# Patient Record
Sex: FEMALE | Race: WHITE | Employment: FULL TIME | URBAN - METROPOLITAN AREA
[De-identification: names, ages, dates, MRNs, and addresses within clinical notes are randomized per-mention and may not be internally consistent; named-entity substitution may affect disease eponyms.]

---

## 2021-04-14 ENCOUNTER — OFFICE VISIT (OUTPATIENT)
Dept: PERINATAL CARE | Facility: HOSPITAL | Age: 31
End: 2021-04-14
Attending: OBSTETRICS & GYNECOLOGY
Payer: COMMERCIAL

## 2021-04-14 VITALS
HEART RATE: 94 BPM | HEIGHT: 60 IN | BODY MASS INDEX: 26.7 KG/M2 | WEIGHT: 136 LBS | DIASTOLIC BLOOD PRESSURE: 79 MMHG | SYSTOLIC BLOOD PRESSURE: 122 MMHG

## 2021-04-14 DIAGNOSIS — Z03.75 SUSPECTED SHORTENING OF CERVIX NOT FOUND: ICD-10-CM

## 2021-04-14 DIAGNOSIS — O30.042 TWIN PREGNANCY, DICHORIONIC/DIAMNIOTIC, SECOND TRIMESTER: ICD-10-CM

## 2021-04-14 DIAGNOSIS — O30.042 TWIN PREGNANCY, DICHORIONIC/DIAMNIOTIC, SECOND TRIMESTER: Primary | ICD-10-CM

## 2021-04-14 PROCEDURE — 76817 TRANSVAGINAL US OBSTETRIC: CPT

## 2021-04-14 PROCEDURE — 76812 OB US DETAILED ADDL FETUS: CPT

## 2021-04-14 PROCEDURE — 76817 TRANSVAGINAL US OBSTETRIC: CPT | Performed by: OBSTETRICS & GYNECOLOGY

## 2021-04-14 PROCEDURE — 99203 OFFICE O/P NEW LOW 30 MIN: CPT | Performed by: OBSTETRICS & GYNECOLOGY

## 2021-04-14 PROCEDURE — 76811 OB US DETAILED SNGL FETUS: CPT | Performed by: OBSTETRICS & GYNECOLOGY

## 2021-04-14 PROCEDURE — 76812 OB US DETAILED ADDL FETUS: CPT | Performed by: OBSTETRICS & GYNECOLOGY

## 2021-04-14 NOTE — PROGRESS NOTES
Outpatient Maternal-Fetal Medicine Consultation    Dear Dr. Kaiser Yarbrough    Thank you for requesting ultrasound evaluation and maternal fetal medicine consultation on your patient Sonia Chicho.   As you are aware she is a 32year old female  with a twin pre ULTRASOUND  The patient had a twin level II ultrasound today which revealed a dichorionic diamniotic twin gestation with size consistent with dates and a normal detailed anatomic survey for twins A & B.      Ultrasound findings:   Twin A - IUP in cephalic p treatment.      Increased  Mortality of Twin Gestations   The mortality rate of infants is higher in multiple than torres pregnancies because of the increased rates of prematurity, growth abnormalities, other obstetric complications, and congeni strategies. IMPRESSION:  · IUP at 20w1d  · Dichorionic twin pregnancy     RECOMMENDATIONS:  · Continue care with Dr. Dimas Browne   · Weight gain 31-50 lb  Monthly growth US   Weekly NSTs at 32 weeks  Delivery at 38-38w6d.   ·       Thank you for lewisin

## 2021-05-25 NOTE — PROGRESS NOTES
Eliza Parker    Dear Dr. Simone Aguilera    Thank you for requesting ultrasound evaluation and maternal fetal medicine consultation on your patient Alicia Bland.   As you are aware she is a 32year old female  with a twin pregna of structural abnormalities are seen today. The patient understands that ultrasound cannot rule out all structural and chromosomal abnormalities.     Discordance - 2.7 %   See PACS/Imaging Tab For Complete Ultrasound Report  I interpreted the results and re

## 2021-05-26 ENCOUNTER — ULTRASOUND ENCOUNTER (OUTPATIENT)
Dept: PERINATAL CARE | Facility: HOSPITAL | Age: 31
End: 2021-05-26
Attending: OBSTETRICS & GYNECOLOGY
Payer: COMMERCIAL

## 2021-05-26 VITALS
HEIGHT: 60 IN | SYSTOLIC BLOOD PRESSURE: 135 MMHG | BODY MASS INDEX: 28.86 KG/M2 | WEIGHT: 147 LBS | DIASTOLIC BLOOD PRESSURE: 76 MMHG | HEART RATE: 114 BPM

## 2021-05-26 DIAGNOSIS — O30.042 TWIN PREGNANCY, DICHORIONIC/DIAMNIOTIC, SECOND TRIMESTER: Primary | ICD-10-CM

## 2021-05-26 DIAGNOSIS — O30.042 TWIN PREGNANCY, DICHORIONIC/DIAMNIOTIC, SECOND TRIMESTER: ICD-10-CM

## 2021-05-26 PROCEDURE — 76816 OB US FOLLOW-UP PER FETUS: CPT | Performed by: OBSTETRICS & GYNECOLOGY

## 2021-05-26 PROCEDURE — 99212 OFFICE O/P EST SF 10 MIN: CPT | Performed by: OBSTETRICS & GYNECOLOGY

## 2021-06-23 ENCOUNTER — ULTRASOUND ENCOUNTER (OUTPATIENT)
Dept: PERINATAL CARE | Facility: HOSPITAL | Age: 31
End: 2021-06-23
Attending: OBSTETRICS & GYNECOLOGY
Payer: COMMERCIAL

## 2021-06-23 VITALS
HEART RATE: 103 BPM | DIASTOLIC BLOOD PRESSURE: 70 MMHG | SYSTOLIC BLOOD PRESSURE: 124 MMHG | BODY MASS INDEX: 29 KG/M2 | WEIGHT: 149 LBS

## 2021-06-23 DIAGNOSIS — O30.043 DICHORIONIC DIAMNIOTIC TWIN PREGNANCY IN THIRD TRIMESTER: ICD-10-CM

## 2021-06-23 DIAGNOSIS — O30.042 TWIN PREGNANCY, DICHORIONIC/DIAMNIOTIC, SECOND TRIMESTER: ICD-10-CM

## 2021-06-23 DIAGNOSIS — O30.042 TWIN PREGNANCY, DICHORIONIC/DIAMNIOTIC, SECOND TRIMESTER: Primary | ICD-10-CM

## 2021-06-23 PROCEDURE — 76816 OB US FOLLOW-UP PER FETUS: CPT | Performed by: OBSTETRICS & GYNECOLOGY

## 2021-06-23 PROCEDURE — 99212 OFFICE O/P EST SF 10 MIN: CPT | Performed by: OBSTETRICS & GYNECOLOGY

## 2021-06-23 RX ORDER — MELATONIN
325
COMMUNITY

## 2021-06-23 NOTE — PROGRESS NOTES
Kartik Polanco    Dear Dr. Calista Hu    Thank you for requesting ultrasound evaluation and maternal fetal medicine consultation on your patient Bina Rascon.   As you are aware she is a 32year old female  with a torres p structural abnormalities are seen today. The patient understands that ultrasound cannot rule out all structural and chromosomal abnormalities.     Discordance - 7.1 %                 See PACS/Imaging Tab For Complete Ultrasound Report  I interpreted the res

## 2021-07-08 ENCOUNTER — TELEPHONE (OUTPATIENT)
Dept: HEMATOLOGY/ONCOLOGY | Facility: HOSPITAL | Age: 31
End: 2021-07-08

## 2021-07-08 PROBLEM — O99.013 ANEMIA COMPLICATING PREGNANCY IN THIRD TRIMESTER: Status: ACTIVE | Noted: 2021-07-08

## 2021-07-08 PROBLEM — O99.013 ANEMIA COMPLICATING PREGNANCY IN THIRD TRIMESTER (HCC): Status: ACTIVE | Noted: 2021-07-08

## 2021-07-08 NOTE — TELEPHONE ENCOUNTER
Spoke with patient to arrange transfusion. She states she was going to see another hematologist and receive her transfusion at [facility]. RN asked patient to call OB office with update. Order set removed and order discarded.

## 2021-07-08 NOTE — TELEPHONE ENCOUNTER
Received blood transfusion orders from Dr. Loren Juarez office. Spoke with Simone Shea and aware that only monitoring involved for out patient transfusion is maternal VS. Verbalized understanding and OK to proceed, she will also verify Dr. Jj Loomis is aware.  NST done

## 2021-07-21 ENCOUNTER — ULTRASOUND ENCOUNTER (OUTPATIENT)
Dept: PERINATAL CARE | Facility: HOSPITAL | Age: 31
End: 2021-07-21
Attending: OBSTETRICS & GYNECOLOGY
Payer: COMMERCIAL

## 2021-07-21 VITALS
WEIGHT: 156 LBS | HEART RATE: 87 BPM | BODY MASS INDEX: 30.63 KG/M2 | SYSTOLIC BLOOD PRESSURE: 131 MMHG | DIASTOLIC BLOOD PRESSURE: 77 MMHG | HEIGHT: 60 IN

## 2021-07-21 DIAGNOSIS — O30.043 DICHORIONIC DIAMNIOTIC TWIN PREGNANCY IN THIRD TRIMESTER: ICD-10-CM

## 2021-07-21 DIAGNOSIS — O30.043 DICHORIONIC DIAMNIOTIC TWIN PREGNANCY IN THIRD TRIMESTER: Primary | ICD-10-CM

## 2021-07-21 PROCEDURE — 76819 FETAL BIOPHYS PROFIL W/O NST: CPT

## 2021-07-21 PROCEDURE — 99212 OFFICE O/P EST SF 10 MIN: CPT | Performed by: OBSTETRICS & GYNECOLOGY

## 2021-07-21 PROCEDURE — 76819 FETAL BIOPHYS PROFIL W/O NST: CPT | Performed by: OBSTETRICS & GYNECOLOGY

## 2021-07-21 PROCEDURE — 76816 OB US FOLLOW-UP PER FETUS: CPT | Performed by: OBSTETRICS & GYNECOLOGY

## 2021-07-21 NOTE — PROGRESS NOTES
Pt here for growth ultrasound on twin gestation  States +FM  Pt states getting iron infusions next one 7/22  No complaints

## 2021-07-21 NOTE — PROGRESS NOTES
Bailee Peraza    Dear Dr. Calista Hu    Thank you for requesting ultrasound evaluation and maternal fetal medicine consultation on your patient Bina Rascon.   As you are aware she is a 32year old female  with a torres p No gross ultrasound evidence of structural abnormalities are seen today. The patient understands that ultrasound cannot rule out all structural and chromosomal abnormalities.     Discordance - 5.8 %     Twin B is presenting in the cephalic position during t

## 2021-08-14 ENCOUNTER — TELEPHONE (OUTPATIENT)
Dept: OBGYN UNIT | Facility: HOSPITAL | Age: 31
End: 2021-08-14

## 2021-08-18 ENCOUNTER — ANESTHESIA EVENT (OUTPATIENT)
Dept: OBGYN UNIT | Facility: HOSPITAL | Age: 31
End: 2021-08-18
Payer: COMMERCIAL

## 2021-08-18 ENCOUNTER — HOSPITAL ENCOUNTER (INPATIENT)
Facility: HOSPITAL | Age: 31
LOS: 2 days | Discharge: HOME | End: 2021-08-20
Attending: OBSTETRICS & GYNECOLOGY | Admitting: OBSTETRICS & GYNECOLOGY
Payer: COMMERCIAL

## 2021-08-18 ENCOUNTER — ANESTHESIA (OUTPATIENT)
Dept: OBGYN UNIT | Facility: HOSPITAL | Age: 31
End: 2021-08-18
Payer: COMMERCIAL

## 2021-08-18 PROBLEM — Z34.90 PREGNANCY (HCC): Status: ACTIVE | Noted: 2021-08-18

## 2021-08-18 PROBLEM — Z34.90 PREGNANCY: Status: ACTIVE | Noted: 2021-08-18

## 2021-08-18 LAB
ANTIBODY SCREEN: NEGATIVE
BASOPHILS # BLD AUTO: 0.05 X10(3) UL (ref 0–0.2)
BASOPHILS NFR BLD AUTO: 0.5 %
EOSINOPHIL # BLD AUTO: 0.06 X10(3) UL (ref 0–0.7)
EOSINOPHIL NFR BLD AUTO: 0.6 %
HCT VFR BLD AUTO: 35 %
HGB BLD-MCNC: 11.4 G/DL
IMM GRANULOCYTES # BLD AUTO: 0.17 X10(3) UL (ref 0–1)
IMM GRANULOCYTES NFR BLD: 1.6 %
LYMPHOCYTES # BLD AUTO: 2.59 X10(3) UL (ref 1–4)
LYMPHOCYTES NFR BLD AUTO: 24 %
MCH RBC QN AUTO: 27.3 PG (ref 26–34)
MCHC RBC AUTO-ENTMCNC: 32.6 G/DL (ref 31–37)
MCV RBC AUTO: 83.7 FL
MONOCYTES # BLD AUTO: 0.49 X10(3) UL (ref 0.1–1)
MONOCYTES NFR BLD AUTO: 4.5 %
NEUTROPHILS # BLD AUTO: 7.44 X10 (3) UL (ref 1.5–7.7)
NEUTROPHILS # BLD AUTO: 7.44 X10(3) UL (ref 1.5–7.7)
NEUTROPHILS NFR BLD AUTO: 68.8 %
PLATELET # BLD AUTO: 137 10(3)UL (ref 150–450)
PLATELET MORPHOLOGY: NORMAL
RBC # BLD AUTO: 4.18 X10(6)UL
RH BLOOD TYPE: POSITIVE
T PALLIDUM AB SER QL IA: NONREACTIVE
WBC # BLD AUTO: 10.8 X10(3) UL (ref 4–11)

## 2021-08-18 PROCEDURE — 86780 TREPONEMA PALLIDUM: CPT | Performed by: OBSTETRICS & GYNECOLOGY

## 2021-08-18 PROCEDURE — 86901 BLOOD TYPING SEROLOGIC RH(D): CPT | Performed by: OBSTETRICS & GYNECOLOGY

## 2021-08-18 PROCEDURE — 88307 TISSUE EXAM BY PATHOLOGIST: CPT | Performed by: OBSTETRICS & GYNECOLOGY

## 2021-08-18 PROCEDURE — 86900 BLOOD TYPING SEROLOGIC ABO: CPT | Performed by: OBSTETRICS & GYNECOLOGY

## 2021-08-18 PROCEDURE — 85025 COMPLETE CBC W/AUTO DIFF WBC: CPT | Performed by: OBSTETRICS & GYNECOLOGY

## 2021-08-18 PROCEDURE — 86850 RBC ANTIBODY SCREEN: CPT | Performed by: OBSTETRICS & GYNECOLOGY

## 2021-08-18 RX ORDER — ONDANSETRON 2 MG/ML
4 INJECTION INTRAMUSCULAR; INTRAVENOUS ONCE AS NEEDED
Status: ACTIVE | OUTPATIENT
Start: 2021-08-18 | End: 2021-08-18

## 2021-08-18 RX ORDER — METOCLOPRAMIDE HYDROCHLORIDE 5 MG/ML
INJECTION INTRAMUSCULAR; INTRAVENOUS
Status: COMPLETED
Start: 2021-08-18 | End: 2021-08-18

## 2021-08-18 RX ORDER — CEFAZOLIN SODIUM/WATER 2 G/20 ML
2 SYRINGE (ML) INTRAVENOUS ONCE
Status: COMPLETED | OUTPATIENT
Start: 2021-08-18 | End: 2021-08-18

## 2021-08-18 RX ORDER — DOCUSATE SODIUM 100 MG/1
100 CAPSULE, LIQUID FILLED ORAL
Status: DISCONTINUED | OUTPATIENT
Start: 2021-08-19 | End: 2021-08-20

## 2021-08-18 RX ORDER — MORPHINE SULFATE 2 MG/ML
INJECTION, SOLUTION INTRAMUSCULAR; INTRAVENOUS AS NEEDED
Status: DISCONTINUED | OUTPATIENT
Start: 2021-08-18 | End: 2021-08-18 | Stop reason: SURG

## 2021-08-18 RX ORDER — DEXAMETHASONE SODIUM PHOSPHATE 4 MG/ML
VIAL (ML) INJECTION AS NEEDED
Status: DISCONTINUED | OUTPATIENT
Start: 2021-08-18 | End: 2021-08-18 | Stop reason: SURG

## 2021-08-18 RX ORDER — NALOXONE HYDROCHLORIDE 0.4 MG/ML
0.08 INJECTION, SOLUTION INTRAMUSCULAR; INTRAVENOUS; SUBCUTANEOUS
Status: ACTIVE | OUTPATIENT
Start: 2021-08-18 | End: 2021-08-19

## 2021-08-18 RX ORDER — SIMETHICONE 80 MG
80 TABLET,CHEWABLE ORAL 3 TIMES DAILY PRN
Status: DISCONTINUED | OUTPATIENT
Start: 2021-08-18 | End: 2021-08-20

## 2021-08-18 RX ORDER — HYDROMORPHONE HYDROCHLORIDE 1 MG/ML
0.3 INJECTION, SOLUTION INTRAMUSCULAR; INTRAVENOUS; SUBCUTANEOUS EVERY 2 HOUR PRN
Status: DISCONTINUED | OUTPATIENT
Start: 2021-08-18 | End: 2021-08-20

## 2021-08-18 RX ORDER — KETOROLAC TROMETHAMINE 30 MG/ML
30 INJECTION, SOLUTION INTRAMUSCULAR; INTRAVENOUS ONCE AS NEEDED
Status: ACTIVE | OUTPATIENT
Start: 2021-08-18 | End: 2021-08-18

## 2021-08-18 RX ORDER — DIPHENHYDRAMINE HCL 25 MG
25 CAPSULE ORAL EVERY 4 HOURS PRN
Status: DISCONTINUED | OUTPATIENT
Start: 2021-08-18 | End: 2021-08-20

## 2021-08-18 RX ORDER — BISACODYL 10 MG
10 SUPPOSITORY, RECTAL RECTAL
Status: DISCONTINUED | OUTPATIENT
Start: 2021-08-18 | End: 2021-08-20

## 2021-08-18 RX ORDER — OXYCODONE HYDROCHLORIDE 5 MG/1
5 TABLET ORAL EVERY 6 HOURS PRN
Status: DISCONTINUED | OUTPATIENT
Start: 2021-08-18 | End: 2021-08-20

## 2021-08-18 RX ORDER — ONDANSETRON 2 MG/ML
4 INJECTION INTRAMUSCULAR; INTRAVENOUS EVERY 6 HOURS PRN
Status: DISCONTINUED | OUTPATIENT
Start: 2021-08-18 | End: 2021-08-20

## 2021-08-18 RX ORDER — METOCLOPRAMIDE HYDROCHLORIDE 5 MG/ML
10 INJECTION INTRAMUSCULAR; INTRAVENOUS ONCE
Status: COMPLETED | OUTPATIENT
Start: 2021-08-18 | End: 2021-08-18

## 2021-08-18 RX ORDER — IBUPROFEN 600 MG/1
600 TABLET ORAL EVERY 6 HOURS
Status: DISCONTINUED | OUTPATIENT
Start: 2021-08-19 | End: 2021-08-20

## 2021-08-18 RX ORDER — ACETAMINOPHEN 500 MG
1000 TABLET ORAL ONCE
Status: COMPLETED | OUTPATIENT
Start: 2021-08-18 | End: 2021-08-18

## 2021-08-18 RX ORDER — SODIUM CHLORIDE, SODIUM LACTATE, POTASSIUM CHLORIDE, CALCIUM CHLORIDE 600; 310; 30; 20 MG/100ML; MG/100ML; MG/100ML; MG/100ML
INJECTION, SOLUTION INTRAVENOUS CONTINUOUS
Status: DISCONTINUED | OUTPATIENT
Start: 2021-08-18 | End: 2021-08-20

## 2021-08-18 RX ORDER — KETOROLAC TROMETHAMINE 30 MG/ML
30 INJECTION, SOLUTION INTRAMUSCULAR; INTRAVENOUS EVERY 6 HOURS
Status: DISPENSED | OUTPATIENT
Start: 2021-08-18 | End: 2021-08-19

## 2021-08-18 RX ORDER — KETOROLAC TROMETHAMINE 30 MG/ML
INJECTION, SOLUTION INTRAMUSCULAR; INTRAVENOUS
Status: COMPLETED
Start: 2021-08-18 | End: 2021-08-18

## 2021-08-18 RX ORDER — HYDROMORPHONE HYDROCHLORIDE 1 MG/ML
0.4 INJECTION, SOLUTION INTRAMUSCULAR; INTRAVENOUS; SUBCUTANEOUS EVERY 5 MIN PRN
Status: ACTIVE | OUTPATIENT
Start: 2021-08-18 | End: 2021-08-19

## 2021-08-18 RX ORDER — DIPHENHYDRAMINE HYDROCHLORIDE 50 MG/ML
25 INJECTION INTRAMUSCULAR; INTRAVENOUS ONCE AS NEEDED
Status: ACTIVE | OUTPATIENT
Start: 2021-08-18 | End: 2021-08-18

## 2021-08-18 RX ORDER — DEXTROSE, SODIUM CHLORIDE, SODIUM LACTATE, POTASSIUM CHLORIDE, AND CALCIUM CHLORIDE 5; .6; .31; .03; .02 G/100ML; G/100ML; G/100ML; G/100ML; G/100ML
INJECTION, SOLUTION INTRAVENOUS CONTINUOUS PRN
Status: DISCONTINUED | OUTPATIENT
Start: 2021-08-18 | End: 2021-08-20

## 2021-08-18 RX ORDER — DIPHENHYDRAMINE HYDROCHLORIDE 50 MG/ML
12.5 INJECTION INTRAMUSCULAR; INTRAVENOUS EVERY 4 HOURS PRN
Status: DISCONTINUED | OUTPATIENT
Start: 2021-08-18 | End: 2021-08-20

## 2021-08-18 RX ORDER — NALBUPHINE HCL 10 MG/ML
2.5 AMPUL (ML) INJECTION EVERY 4 HOURS PRN
Status: DISCONTINUED | OUTPATIENT
Start: 2021-08-18 | End: 2021-08-20

## 2021-08-18 RX ORDER — TRISODIUM CITRATE DIHYDRATE AND CITRIC ACID MONOHYDRATE 500; 334 MG/5ML; MG/5ML
30 SOLUTION ORAL ONCE
Status: DISCONTINUED | OUTPATIENT
Start: 2021-08-18 | End: 2021-08-18

## 2021-08-18 RX ORDER — ACETAMINOPHEN 500 MG
1000 TABLET ORAL EVERY 6 HOURS
Status: DISCONTINUED | OUTPATIENT
Start: 2021-08-18 | End: 2021-08-20

## 2021-08-18 RX ORDER — SODIUM CHLORIDE, SODIUM LACTATE, POTASSIUM CHLORIDE, CALCIUM CHLORIDE 600; 310; 30; 20 MG/100ML; MG/100ML; MG/100ML; MG/100ML
125 INJECTION, SOLUTION INTRAVENOUS CONTINUOUS
Status: DISCONTINUED | OUTPATIENT
Start: 2021-08-18 | End: 2021-08-18

## 2021-08-18 RX ORDER — MORPHINE SULFATE 0.5 MG/ML
0.2 INJECTION, SOLUTION EPIDURAL; INTRATHECAL; INTRAVENOUS ONCE
Status: DISCONTINUED | OUTPATIENT
Start: 2021-08-18 | End: 2021-08-18

## 2021-08-18 RX ORDER — METOCLOPRAMIDE HYDROCHLORIDE 5 MG/ML
10 INJECTION INTRAMUSCULAR; INTRAVENOUS EVERY 6 HOURS PRN
Status: DISCONTINUED | OUTPATIENT
Start: 2021-08-18 | End: 2021-08-20

## 2021-08-18 RX ORDER — BUPIVACAINE HYDROCHLORIDE 7.5 MG/ML
INJECTION, SOLUTION INTRASPINAL AS NEEDED
Status: DISCONTINUED | OUTPATIENT
Start: 2021-08-18 | End: 2021-08-18 | Stop reason: SURG

## 2021-08-18 RX ORDER — ONDANSETRON 2 MG/ML
4 INJECTION INTRAMUSCULAR; INTRAVENOUS EVERY 6 HOURS PRN
Status: DISCONTINUED | OUTPATIENT
Start: 2021-08-18 | End: 2021-08-18

## 2021-08-18 RX ORDER — NALBUPHINE HCL 10 MG/ML
2.5 AMPUL (ML) INJECTION
Status: DISCONTINUED | OUTPATIENT
Start: 2021-08-18 | End: 2021-08-20

## 2021-08-18 RX ADMIN — SODIUM CHLORIDE, SODIUM LACTATE, POTASSIUM CHLORIDE, CALCIUM CHLORIDE: 600; 310; 30; 20 INJECTION, SOLUTION INTRAVENOUS at 11:35:00

## 2021-08-18 RX ADMIN — MORPHINE SULFATE 0.2 MG: 2 INJECTION, SOLUTION INTRAMUSCULAR; INTRAVENOUS at 10:47:00

## 2021-08-18 RX ADMIN — CEFAZOLIN SODIUM/WATER 2 G: 2 G/20 ML SYRINGE (ML) INTRAVENOUS at 10:54:00

## 2021-08-18 RX ADMIN — BUPIVACAINE HYDROCHLORIDE 1.5 ML: 7.5 INJECTION, SOLUTION INTRASPINAL at 10:47:00

## 2021-08-18 RX ADMIN — SODIUM CHLORIDE, SODIUM LACTATE, POTASSIUM CHLORIDE, CALCIUM CHLORIDE: 600; 310; 30; 20 INJECTION, SOLUTION INTRAVENOUS at 10:39:00

## 2021-08-18 RX ADMIN — DEXAMETHASONE SODIUM PHOSPHATE 4 MG: 4 MG/ML VIAL (ML) INJECTION at 10:52:00

## 2021-08-18 NOTE — ANESTHESIA PREPROCEDURE EVALUATION
PRE-OP EVALUATION    Patient Name: Sonia Valentino    Admkrystal Diagnosis: Pregnancy  Pregnancy    Pre-op Diagnosis: spontaneous di-di twins, IUP @ 38.1 weeks      SECTION    Anesthesia Procedure:  SECTION (N/A )    Surgeon(s) and Role:     * Go (+) anemia                   Pulmonary               (-) shortness of breath            Neuro/Psych                                    Past Surgical History:   Procedure Laterality Date   • APPENDECTOMY       Social History    Tobacco Use      Smokin additional sedating/analgesic medications, and/or conversion to GA was also discussed. Patient verbalizes understanding of risks. All questions were answered. Agreed to proceed with procedure as planned under spinal anesthesia.     Plan/risks discussed with

## 2021-08-18 NOTE — PROGRESS NOTES
Pt is a 32year old female admitted to [facility]. Patient presents with:  Scheduled : twins     Pt is  38w1d intra-uterine pregnancy. History obtained, consents signed. Oriented to room, staff, and plan of care.

## 2021-08-18 NOTE — H&P
[facility]    History & Physical    Evone Screws Patient Status:  Inpatient    1990 MRN [id number]   Location [facility] [facility] Attending Long Luna MD   Hosp Day # 0 PCP No primary care provider on file.      Date of Adm Surveillance:  140 BPM   mod howard, +accels, no decels   135 mod howard, +accels, no decels      Cervix: not digitally examined     Lab Review:  O, Rh+, Rubella-immune, Hepatitis B surface antigen non-reactive, GBS negative     ASSESSMENT:   at 38+1 weeks h

## 2021-08-18 NOTE — ANESTHESIA POSTPROCEDURE EVALUATION
[place] [address] Patient Status:  Inpatient   Age/Gender 32year old female MRN [id number]   Location 1818 [facility] Attending Sharlene Blair MD   Hosp Day # 0 PCP No primary care provider on file.        Anesthesia Pos

## 2021-08-18 NOTE — ANESTHESIA PROCEDURE NOTES
Spinal Block  Performed by: Omar Hoffman MD  Authorized by: Omar Hoffman MD       General Information and Staff    Start Time:  8/18/2021 10:45 AM  End Time:  8/18/2021 10:47 AM  Anesthesiologist:  Omar Hoffman MD  Performed by:   Anesthesiologist  Patient

## 2021-08-18 NOTE — OPERATIVE REPORT
[facility] Patient Status:  Inpatient    1990 MRN [id number]   Location 1818 [facility] Attending Geovanna Mata MD   Hosp Day # 0 PCP No primary care provider on file.      Date of procedure: 21    Pre cord clamping 30 seconds--and handed to the neonatologist. Baby B's water was then broken, and baby B was delivered cephalic with delayed cord clamping 30 seconds. Cord blood was collected. The placentas were delivered manually.     The uterus was exteriori Presentation: Vertex  Position:          Resuscitation:    Cord information:    Disposition of cord blood:     Blood gases sent?    Complications:    Placenta: Delivered:       appearance  [facility] Measurements:  Weight: 6 lb 2.4 oz (2.79 kg)  Length: 1'

## 2021-08-18 NOTE — PROGRESS NOTES
Nursing admission note    Pt admitted via cart  ID bands are verified   Oriented to room  Safety precautions are initiated  Bed in low position  Call light in reach  IV infusing, placed on pump  Pollard draining  Pt instructed to remain in bed and call for a

## 2021-08-18 NOTE — PROGRESS NOTES
eliana viramontes, [email or web address] lochia, Pt and baby transferred to assigned room in [facility], vital signs stable for pt and babies on transfer, All pt belongings sent with pt transfer, babies ID band verified and matched with parents.

## 2021-08-19 LAB
BASOPHILS # BLD AUTO: 0.03 X10(3) UL (ref 0–0.2)
BASOPHILS NFR BLD AUTO: 0.2 %
EOSINOPHIL # BLD AUTO: 0.04 X10(3) UL (ref 0–0.7)
EOSINOPHIL NFR BLD AUTO: 0.3 %
HCT VFR BLD AUTO: 26.4 %
HGB BLD-MCNC: 8.5 G/DL
IMM GRANULOCYTES # BLD AUTO: 0.16 X10(3) UL (ref 0–1)
IMM GRANULOCYTES NFR BLD: 1.1 %
LYMPHOCYTES # BLD AUTO: 2.73 X10(3) UL (ref 1–4)
LYMPHOCYTES NFR BLD AUTO: 19.3 %
MCH RBC QN AUTO: 27.2 PG (ref 26–34)
MCHC RBC AUTO-ENTMCNC: 32.2 G/DL (ref 31–37)
MCV RBC AUTO: 84.3 FL
MONOCYTES # BLD AUTO: 0.73 X10(3) UL (ref 0.1–1)
MONOCYTES NFR BLD AUTO: 5.2 %
NEUTROPHILS # BLD AUTO: 10.46 X10 (3) UL (ref 1.5–7.7)
NEUTROPHILS # BLD AUTO: 10.46 X10(3) UL (ref 1.5–7.7)
NEUTROPHILS NFR BLD AUTO: 73.9 %
PLATELET # BLD AUTO: 140 10(3)UL (ref 150–450)
RBC # BLD AUTO: 3.13 X10(6)UL
WBC # BLD AUTO: 14.2 X10(3) UL (ref 4–11)

## 2021-08-19 PROCEDURE — 85025 COMPLETE CBC W/AUTO DIFF WBC: CPT | Performed by: OBSTETRICS & GYNECOLOGY

## 2021-08-19 NOTE — PROGRESS NOTES
[facility] 2SW-J sunil Pulido Patient Status:  Inpatient   Age/Gender 32year old female MRN [id number]   [facility] 2SW-J Attending Reema Alexandra MD   [facility] Day # 1 PCP No primary care provider on file.       Anesthesi

## 2021-08-19 NOTE — PROGRESS NOTES
OB Progress Note POD#1    S: She is feeling well. Minimal VB. Pain controlled. Breastfeeding. Voiding without difficulty, + flatus, no BM    O:  Blood pressure 134/80, pulse 80, temperature 97.5 °F (36.4 °C), temperature source Oral, resp.  rate 16, height

## 2021-08-20 VITALS
BODY MASS INDEX: 30.82 KG/M2 | DIASTOLIC BLOOD PRESSURE: 85 MMHG | HEART RATE: 77 BPM | RESPIRATION RATE: 16 BRPM | WEIGHT: 157 LBS | OXYGEN SATURATION: 100 % | HEIGHT: 60 IN | SYSTOLIC BLOOD PRESSURE: 132 MMHG | TEMPERATURE: 98 F

## 2021-08-20 RX ORDER — HYDROCODONE BITARTRATE AND ACETAMINOPHEN 5; 325 MG/1; MG/1
1-2 TABLET ORAL EVERY 4 HOURS PRN
Qty: 15 TABLET | Refills: 0 | Status: SHIPPED | OUTPATIENT
Start: 2021-08-20 | End: 2021-09-30 | Stop reason: ALTCHOICE

## 2021-08-20 RX ORDER — IBUPROFEN 600 MG/1
600 TABLET ORAL EVERY 6 HOURS
Qty: 20 TABLET | Refills: 0 | Status: SHIPPED | OUTPATIENT
Start: 2021-08-20 | End: 2021-09-30 | Stop reason: ALTCHOICE

## 2021-08-20 NOTE — PROGRESS NOTES
[facility]  Post-Partum Caesarean Section Progress Note    Pepe Merritt Patient Status:  Inpatient    1990 MRN [id number]   [facility] 2SW-J Attending Rashawn Ogden MD   Hosp Day # 2 PCP No primary care provider on file. reviewed.      Arely Dueñas MD  8/20/2021  9:51 AM

## 2021-08-20 NOTE — PROGRESS NOTES
Discharge patient home as order. Teaching complete, patient feel comfortable to take care herself and  twins. Discharge procedure complete, sent patient and twins to their family car @ 18.

## 2021-08-23 ENCOUNTER — TELEPHONE (OUTPATIENT)
Dept: OBGYN UNIT | Facility: HOSPITAL | Age: 31
End: 2021-08-23

## 2021-08-23 NOTE — PROGRESS NOTES
Reviewed self and infant care w / mom, she verbalizes understanding of instructions reviewed. Encourage to follow up w/ MDs as directed and w/ questions/concerns. Enc to join support groups.

## 2021-08-26 NOTE — DISCHARGE SUMMARY
[facility]  OB Discharge Summary    Sonia Valentino Patient Status:  Inpatient    1990 MRN [id number]   [facility] 2SW-J Attending No att. providers found   [facility] Day # 2 PCP No primary care provider on file.      Date of Admission: 8

## 2023-11-06 ENCOUNTER — HOSPITAL ENCOUNTER (OUTPATIENT)
Age: 33
End: 2023-11-06
Payer: COMMERCIAL

## 2023-11-06 PROCEDURE — S0028 INJECTION, FAMOTIDINE, 20 MG: HCPCS

## 2025-05-12 ENCOUNTER — HOSPITAL ENCOUNTER (OUTPATIENT)
Age: 35
Discharge: HOME | End: 2025-05-12
Attending: EMERGENCY MEDICINE
Payer: MEDICAID

## 2025-05-12 VITALS
HEART RATE: 116 BPM | RESPIRATION RATE: 16 BRPM | TEMPERATURE: 100 F | OXYGEN SATURATION: 100 % | SYSTOLIC BLOOD PRESSURE: 136 MMHG | DIASTOLIC BLOOD PRESSURE: 81 MMHG

## 2025-05-12 DIAGNOSIS — J98.01 BRONCHOSPASM: Primary | ICD-10-CM

## 2025-05-12 LAB — SARS-COV-2 RNA RESP QL NAA+PROBE: NOT DETECTED

## 2025-05-12 PROCEDURE — 94640 AIRWAY INHALATION TREATMENT: CPT

## 2025-05-12 PROCEDURE — 99204 OFFICE O/P NEW MOD 45 MIN: CPT

## 2025-05-12 RX ORDER — IBUPROFEN 600 MG/1
600 TABLET, FILM COATED ORAL ONCE
Status: COMPLETED | OUTPATIENT
Start: 2025-05-12 | End: 2025-05-12

## 2025-05-12 RX ORDER — IPRATROPIUM BROMIDE AND ALBUTEROL SULFATE 2.5; .5 MG/3ML; MG/3ML
3 SOLUTION RESPIRATORY (INHALATION) ONCE
Status: COMPLETED | OUTPATIENT
Start: 2025-05-12 | End: 2025-05-12

## 2025-05-12 RX ORDER — ACETAMINOPHEN 500 MG
1000 TABLET ORAL ONCE
Status: DISCONTINUED | OUTPATIENT
Start: 2025-05-12 | End: 2025-05-12

## 2025-05-12 RX ORDER — ALBUTEROL SULFATE 90 UG/1
2 INHALANT RESPIRATORY (INHALATION) EVERY 4 HOURS PRN
Qty: 1 EACH | Refills: 0 | Status: SHIPPED | OUTPATIENT
Start: 2025-05-12 | End: 2025-05-13

## 2025-05-12 NOTE — DISCHARGE INSTRUCTIONS
Push fluids  Over-the-counter daytime/nighttime cold and cough medications  Albuterol inhaler with spacer chamber  Warm salty water gargles may help your sore throat  A teaspoon of honey at nighttime to help your sore throat  Albuterol inhaler with spacer-2 puffs about every 4-6 hours  Antibiotic Augmentin    Contact your primary care doctor today to arrange close follow-up

## 2025-05-12 NOTE — ED INITIAL ASSESSMENT (HPI)
C/O cold symptoms, dry cough and fever for a week. Losing voice for 2 days. Feeling short of breath

## 2025-05-12 NOTE — ED PROVIDER NOTES
Patient Seen in: [facility]      History     Chief Complaint   Patient presents with    Cough/URI     Stated Complaint: Cold Symp    Subjective:   HPI  History of Present Illness            Patient started getting sick about a week ago.  Patient complains of stuffy nose, congested, and cough.  Patient has no history of asthma or wheezing.  She is a non-smoker.  She is also lost her voice.  Patient did not do a COVID test at home      Objective:     Past Medical History:    Anemia    iron infusions    History of blood transfusion    Nausea & vomiting              Past Surgical History:   Procedure Laterality Date    Appendectomy                  Social History     Socioeconomic History    Marital status:    Tobacco Use    Smoking status: Never    Smokeless tobacco: Never   Vaping Use    Vaping status: Never Used   Substance and Sexual Activity    Alcohol use: Not Currently    Drug use: Never     Social Drivers of Health      Received from [facility]    Housing Stability              Review of Systems    Positive for stated complaint: Cold Symp  Other systems are as noted in HPI.  Constitutional and vital signs reviewed.      All other systems reviewed and negative except as noted above.                  Physical Exam     ED Triage Vitals [05/12/25 1623]   /81   Pulse 108   Resp 18   Temp 100.2 °F (37.9 °C)   Temp src Oral   SpO2 97 %   O2 Device None (Room air)       Current Vitals:   Vital Signs  BP: 136/81  Pulse: 108  Resp: 18  Temp: 100.2 °F (37.9 °C)  Temp src: Oral    Oxygen Therapy  SpO2: 97 %  O2 Device: None (Room air)          Physical Exam  General: The patient is awake, alert, conversant.  She has a scratchy voice without muffled or hot potato voice  Eyes: sclera white, conjunctiva pink and moist.  Lids and lashes are normal.  Throat: Posterior pharynx mildly erythematous without exudate.  Ears: TMs are normal bilaterally  Nose congested without purulent  a secretions overlying sinus erythema  Lungs: Clear to auscultation bilaterally.  No rhonchi or rales.  Frequent cough sounds bronchospastic  Neurologic:  Mental status as above.  Patient moves all extremities with good strength and coordination.            ED Course     Labs Reviewed   RAPID SARS-COV-2 BY PCR - Normal          Results                              MDM     Patient complains of upper respiratory symptoms now with persistent cough and bronchospasm.  I suspect viral illness with secondary bacterial bronchitis  Patient may benefit from bronchodilator.  She is given albuterol treatment here and I discussed the proper use of inhaler with spacer chamber.  Patient given Tylenol for fever    COVID test negative    Over-the-counter daytime/nighttime cold and cough medications  Albuterol inhaler with spacer chamber  Warm salty water gargles may help your sore throat  A teaspoon of honey at nighttime to help your sore throat  Albuterol inhaler with spacer-2 puffs about every 4-6 hours  Antibiotic Augmentin    Contact your primary care doctor today to arrange close follow-up          Medical Decision Making      Disposition and Plan     Clinical Impression:  1. Bronchospasm         Disposition:  Discharge  5/12/2025  5:29 pm    Follow-up:  No follow-up provider specified.        Medications Prescribed:  Current Discharge Medication List        START taking these medications    Details   albuterol 108 (90 Base) MCG/ACT Inhalation Aero Soln Inhale 2 puffs into the lungs every 4 (four) hours as needed for Wheezing.  Qty: 1 each, Refills: 0      amoxicillin clavulanate 875-125 MG Oral Tab Take 1 tablet by mouth 2 (two) times daily for 10 days.  Qty: 20 tablet, Refills: 0                   Supplementary Documentation:

## 2025-05-13 RX ORDER — ALBUTEROL SULFATE 90 UG/1
2 INHALANT RESPIRATORY (INHALATION) EVERY 4 HOURS PRN
Qty: 1 EACH | Refills: 0 | Status: SHIPPED | OUTPATIENT
Start: 2025-05-13 | End: 2025-05-13

## (undated) NOTE — LETTER
[facility]  [address] [facility] [address]  Dept: [phone]  Dept Fax: [phone]         May 12, 2025    Patient: Candice Martinez   YOB: 1990   Date of Visit: 5/12/2025       To Whom It May Concern:    Candice Martinez was seen and treated in our [facility] department on 5/12/2025. She may return to work on May 15, 2025 .    If you have any questions or concerns, please don't hesitate to call.      Encounter Provider(s):    Jorge Cowart MD     5:42 PM  5/12/2025